# Patient Record
Sex: MALE | Race: ASIAN | NOT HISPANIC OR LATINO
[De-identification: names, ages, dates, MRNs, and addresses within clinical notes are randomized per-mention and may not be internally consistent; named-entity substitution may affect disease eponyms.]

---

## 2020-11-16 ENCOUNTER — APPOINTMENT (OUTPATIENT)
Dept: DERMATOLOGY | Facility: CLINIC | Age: 35
End: 2020-11-16

## 2020-11-16 PROBLEM — Z00.00 ENCOUNTER FOR PREVENTIVE HEALTH EXAMINATION: Status: ACTIVE | Noted: 2020-11-16

## 2022-06-24 ENCOUNTER — APPOINTMENT (OUTPATIENT)
Dept: ENDOCRINOLOGY | Facility: CLINIC | Age: 37
End: 2022-06-24

## 2022-07-11 ENCOUNTER — APPOINTMENT (OUTPATIENT)
Dept: ENDOCRINOLOGY | Facility: CLINIC | Age: 37
End: 2022-07-11

## 2022-07-21 ENCOUNTER — APPOINTMENT (OUTPATIENT)
Dept: GASTROENTEROLOGY | Facility: CLINIC | Age: 37
End: 2022-07-21

## 2022-09-09 ENCOUNTER — APPOINTMENT (OUTPATIENT)
Dept: ENDOCRINOLOGY | Facility: CLINIC | Age: 37
End: 2022-09-09

## 2023-06-01 ENCOUNTER — APPOINTMENT (OUTPATIENT)
Dept: UROLOGY | Facility: CLINIC | Age: 38
End: 2023-06-01
Payer: MEDICAID

## 2023-06-01 VITALS
WEIGHT: 140 LBS | OXYGEN SATURATION: 98 % | HEART RATE: 85 BPM | SYSTOLIC BLOOD PRESSURE: 106 MMHG | DIASTOLIC BLOOD PRESSURE: 67 MMHG | BODY MASS INDEX: 23.9 KG/M2 | TEMPERATURE: 97.9 F | HEIGHT: 64 IN

## 2023-06-01 DIAGNOSIS — N28.9 DISORDER OF KIDNEY AND URETER, UNSPECIFIED: ICD-10-CM

## 2023-06-01 PROCEDURE — 99204 OFFICE O/P NEW MOD 45 MIN: CPT

## 2023-06-02 LAB
ANION GAP SERPL CALC-SCNC: 14 MMOL/L
BUN SERPL-MCNC: 11 MG/DL
CALCIUM SERPL-MCNC: 10 MG/DL
CHLORIDE SERPL-SCNC: 103 MMOL/L
CO2 SERPL-SCNC: 23 MMOL/L
CREAT SERPL-MCNC: 1.02 MG/DL
EGFR: 98 ML/MIN/1.73M2
GLUCOSE SERPL-MCNC: 57 MG/DL
POTASSIUM SERPL-SCNC: 5.1 MMOL/L
SODIUM SERPL-SCNC: 140 MMOL/L

## 2023-06-05 PROBLEM — N28.9 RENAL LESION: Status: ACTIVE | Noted: 2023-06-01

## 2023-06-05 NOTE — HISTORY OF PRESENT ILLNESS
[FreeTextEntry1] : cc abnormal sono \par 36 yo male referred for eval abnornal sono \par sono done for gbshowed \par eft lower pole 1.1 cm echogenci lesion\par no flank pain \par no fam h/o gu ca

## 2023-08-25 ENCOUNTER — APPOINTMENT (OUTPATIENT)
Dept: UROLOGY | Facility: CLINIC | Age: 38
End: 2023-08-25
Payer: MEDICAID

## 2023-08-25 ENCOUNTER — TRANSCRIPTION ENCOUNTER (OUTPATIENT)
Age: 38
End: 2023-08-25

## 2023-08-25 VITALS
TEMPERATURE: 98.2 F | SYSTOLIC BLOOD PRESSURE: 107 MMHG | OXYGEN SATURATION: 99 % | HEIGHT: 64 IN | DIASTOLIC BLOOD PRESSURE: 67 MMHG | HEART RATE: 60 BPM | BODY MASS INDEX: 21.34 KG/M2 | WEIGHT: 125 LBS

## 2023-08-25 PROCEDURE — 99214 OFFICE O/P EST MOD 30 MIN: CPT

## 2023-08-29 LAB
APPEARANCE: ABNORMAL
BACTERIA: NEGATIVE /HPF
BILIRUBIN URINE: NEGATIVE
BLOOD URINE: NEGATIVE
CALCIUM OXALATE CRYSTALS: PRESENT
CAST: 0 /LPF
COLOR: YELLOW
EPITHELIAL CELLS: 0 /HPF
FSH SERPL-MCNC: 2 IU/L
GLUCOSE QUALITATIVE U: NEGATIVE MG/DL
KETONES URINE: NEGATIVE MG/DL
LEUKOCYTE ESTERASE URINE: NEGATIVE
LH SERPL-ACNC: 3 IU/L
MICROSCOPIC-UA: NORMAL
NITRITE URINE: NEGATIVE
PH URINE: 5.5
PROLACTIN SERPL-MCNC: 8 NG/ML
PROTEIN URINE: NEGATIVE MG/DL
RED BLOOD CELLS URINE: 2 /HPF
REVIEW: NORMAL
SPECIFIC GRAVITY URINE: 1.03
TESTOST FREE SERPL-MCNC: 3.4 PG/ML
TESTOST SERPL-MCNC: 409 NG/DL
UROBILINOGEN URINE: 0.2 MG/DL
WHITE BLOOD CELLS URINE: 0 /HPF

## 2023-09-04 NOTE — HISTORY OF PRESENT ILLNESS
[FreeTextEntry1] : cc infertility trying to get pregnant last 2 years  has 4 year old  no smoking drugs r alcohol  told he ahs low sperm count

## 2023-09-15 ENCOUNTER — APPOINTMENT (OUTPATIENT)
Dept: UROLOGY | Facility: CLINIC | Age: 38
End: 2023-09-15
Payer: MEDICAID

## 2023-09-15 VITALS
HEIGHT: 64 IN | RESPIRATION RATE: 16 BRPM | OXYGEN SATURATION: 99 % | TEMPERATURE: 97.3 F | WEIGHT: 125 LBS | HEART RATE: 78 BPM | SYSTOLIC BLOOD PRESSURE: 112 MMHG | DIASTOLIC BLOOD PRESSURE: 75 MMHG | BODY MASS INDEX: 21.34 KG/M2

## 2023-09-15 DIAGNOSIS — Z78.9 OTHER SPECIFIED HEALTH STATUS: ICD-10-CM

## 2023-09-15 PROCEDURE — 99213 OFFICE O/P EST LOW 20 MIN: CPT

## 2024-02-16 ENCOUNTER — APPOINTMENT (OUTPATIENT)
Dept: UROLOGY | Facility: CLINIC | Age: 39
End: 2024-02-16
Payer: MEDICAID

## 2024-02-16 VITALS
WEIGHT: 130 LBS | HEART RATE: 66 BPM | DIASTOLIC BLOOD PRESSURE: 71 MMHG | BODY MASS INDEX: 22.2 KG/M2 | HEIGHT: 64 IN | OXYGEN SATURATION: 98 % | SYSTOLIC BLOOD PRESSURE: 122 MMHG | TEMPERATURE: 97.9 F

## 2024-02-16 DIAGNOSIS — N46.9 MALE INFERTILITY, UNSPECIFIED: ICD-10-CM

## 2024-02-16 PROCEDURE — 99212 OFFICE O/P EST SF 10 MIN: CPT

## 2024-02-19 PROBLEM — N46.9 INFERTILITY MALE: Status: ACTIVE | Noted: 2023-08-25

## 2024-02-19 NOTE — HISTORY OF PRESENT ILLNESS
[FreeTextEntry1] : cc infertility trying to get pregnant last 2 years  has 4 year old  no smoking drugs r alcohol  told he ahs low sperm count  test low   has done several sessions iui  semen parameters prior fairly normal